# Patient Record
Sex: MALE | Race: WHITE
[De-identification: names, ages, dates, MRNs, and addresses within clinical notes are randomized per-mention and may not be internally consistent; named-entity substitution may affect disease eponyms.]

---

## 2019-01-21 ENCOUNTER — HOSPITAL ENCOUNTER (OUTPATIENT)
Dept: HOSPITAL 47 - RADUSWWP | Age: 59
Discharge: HOME | End: 2019-01-21
Attending: INTERNAL MEDICINE
Payer: MEDICARE

## 2019-01-21 DIAGNOSIS — K83.8: ICD-10-CM

## 2019-01-21 DIAGNOSIS — K76.0: Primary | ICD-10-CM

## 2019-01-21 PROCEDURE — 76700 US EXAM ABDOM COMPLETE: CPT

## 2019-01-21 NOTE — US
EXAMINATION TYPE: US abdomen complete

 

DATE OF EXAM: 1/21/2019

 

COMPARISON: NONE

 

CLINICAL HISTORY: 16-year-old male R10.11 RUQ abdominal pain. Intermittent abdomen pain x 1 year, get
s worse after eating

 

TECHNIQUE: Multiple sonographic images of the abdomen are obtained.

 

FINDINGS:

 

EXAM MEASUREMENTS:

 

Liver Length:  18.2 cm   

Gallbladder Wall:  0.2 cm   

CBD:  0.9 cm

Spleen:  10.4 cm   

Right Kidney:  11.2 x 4.6 x 5.7 cm 

Left Kidney:  10.7 x 4.9 x 4.8 cm   

 

 

 

Pancreas:  limited by overlying midline bowel gas

Liver:  enlarged, attenuating, mildly heterogeneous. The secondary limits assessment for focal lesion
. 

Gallbladder:  wnl

**Evidence for sonographic Orellana's sign:  no

CBD:  dilated 

Spleen:  wnl   

Right Kidney:  wnl   

Left Kidney:  wnl   

Upper IVC:  wnl  

Abd Aorta:  visualized portions wnl, limited by overlying midline bowel gas

 

 

 

 

 

IMPRESSION: 

1. Mild hepatomegaly (18.2 cm) with at least moderate hepatic steatosis. Correlate with LFTs, lipid p
rofile, and patient risk factors.

2. No cholelithiasis or evidence for acute cholecystitis.

3. However, the bile duct is dilated at 9 mm. Correlate with alkaline phosphatase and bilirubin level
s to exclude biliary obstruction. ERCP or MRCP if indicated.

## 2019-02-14 ENCOUNTER — HOSPITAL ENCOUNTER (OUTPATIENT)
Dept: HOSPITAL 47 - ORWHC2ENDO | Age: 59
Discharge: HOME | End: 2019-02-14
Payer: MEDICARE

## 2019-02-14 VITALS — HEART RATE: 47 BPM | RESPIRATION RATE: 16 BRPM

## 2019-02-14 VITALS — BODY MASS INDEX: 29.2 KG/M2

## 2019-02-14 VITALS — DIASTOLIC BLOOD PRESSURE: 73 MMHG | SYSTOLIC BLOOD PRESSURE: 122 MMHG

## 2019-02-14 VITALS — TEMPERATURE: 97.3 F

## 2019-02-14 DIAGNOSIS — Z79.899: ICD-10-CM

## 2019-02-14 DIAGNOSIS — F17.210: ICD-10-CM

## 2019-02-14 DIAGNOSIS — K63.5: ICD-10-CM

## 2019-02-14 DIAGNOSIS — D12.4: ICD-10-CM

## 2019-02-14 DIAGNOSIS — C18.0: ICD-10-CM

## 2019-02-14 DIAGNOSIS — D12.3: ICD-10-CM

## 2019-02-14 DIAGNOSIS — K29.50: Primary | ICD-10-CM

## 2019-02-14 DIAGNOSIS — Z88.6: ICD-10-CM

## 2019-02-14 DIAGNOSIS — D12.0: ICD-10-CM

## 2019-02-14 LAB
ALBUMIN SERPL-MCNC: 4.3 G/DL (ref 3.5–5)
ALP SERPL-CCNC: 76 U/L (ref 38–126)
ALT SERPL-CCNC: 60 U/L (ref 21–72)
AMYLASE SERPL-CCNC: 64 U/L (ref 30–110)
ANION GAP SERPL CALC-SCNC: 7 MMOL/L
AST SERPL-CCNC: 36 U/L (ref 17–59)
BASOPHILS # BLD AUTO: 0 K/UL (ref 0–0.2)
BASOPHILS NFR BLD AUTO: 0 %
BUN SERPL-SCNC: 14 MG/DL (ref 9–20)
CALCIUM SPEC-MCNC: 9.6 MG/DL (ref 8.4–10.2)
CHLORIDE SERPL-SCNC: 108 MMOL/L (ref 98–107)
CO2 SERPL-SCNC: 28 MMOL/L (ref 22–30)
EOSINOPHIL # BLD AUTO: 0.2 K/UL (ref 0–0.7)
EOSINOPHIL NFR BLD AUTO: 2 %
ERYTHROCYTE [DISTWIDTH] IN BLOOD BY AUTOMATED COUNT: 5.19 M/UL (ref 4.3–5.9)
ERYTHROCYTE [DISTWIDTH] IN BLOOD: 13.9 % (ref 11.5–15.5)
GLUCOSE SERPL-MCNC: 95 MG/DL (ref 74–99)
HCT VFR BLD AUTO: 48.9 % (ref 39–53)
HGB BLD-MCNC: 16.1 GM/DL (ref 13–17.5)
LIPASE SERPL-CCNC: 74 U/L (ref 23–300)
LYMPHOCYTES # SPEC AUTO: 2.6 K/UL (ref 1–4.8)
LYMPHOCYTES NFR SPEC AUTO: 27 %
MCH RBC QN AUTO: 31 PG (ref 25–35)
MCHC RBC AUTO-ENTMCNC: 32.9 G/DL (ref 31–37)
MCV RBC AUTO: 94.2 FL (ref 80–100)
MONOCYTES # BLD AUTO: 0.5 K/UL (ref 0–1)
MONOCYTES NFR BLD AUTO: 5 %
NEUTROPHILS # BLD AUTO: 6.3 K/UL (ref 1.3–7.7)
NEUTROPHILS NFR BLD AUTO: 65 %
PLATELET # BLD AUTO: 203 K/UL (ref 150–450)
POTASSIUM SERPL-SCNC: 4.4 MMOL/L (ref 3.5–5.1)
PROT SERPL-MCNC: 7.6 G/DL (ref 6.3–8.2)
SODIUM SERPL-SCNC: 143 MMOL/L (ref 137–145)
WBC # BLD AUTO: 9.7 K/UL (ref 3.8–10.6)

## 2019-02-14 PROCEDURE — 83690 ASSAY OF LIPASE: CPT

## 2019-02-14 PROCEDURE — 82103 ALPHA-1-ANTITRYPSIN TOTAL: CPT

## 2019-02-14 PROCEDURE — 86039 ANTINUCLEAR ANTIBODIES (ANA): CPT

## 2019-02-14 PROCEDURE — 82150 ASSAY OF AMYLASE: CPT

## 2019-02-14 PROCEDURE — 86376 MICROSOMAL ANTIBODY EACH: CPT

## 2019-02-14 PROCEDURE — 82390 ASSAY OF CERULOPLASMIN: CPT

## 2019-02-14 PROCEDURE — 85025 COMPLETE CBC W/AUTO DIFF WBC: CPT

## 2019-02-14 PROCEDURE — 83540 ASSAY OF IRON: CPT

## 2019-02-14 PROCEDURE — 43239 EGD BIOPSY SINGLE/MULTIPLE: CPT

## 2019-02-14 PROCEDURE — 45385 COLONOSCOPY W/LESION REMOVAL: CPT

## 2019-02-14 PROCEDURE — 82787 IGG 1 2 3 OR 4 EACH: CPT

## 2019-02-14 PROCEDURE — 86038 ANTINUCLEAR ANTIBODIES: CPT

## 2019-02-14 PROCEDURE — 45380 COLONOSCOPY AND BIOPSY: CPT

## 2019-02-14 PROCEDURE — 83550 IRON BINDING TEST: CPT

## 2019-02-14 PROCEDURE — 88305 TISSUE EXAM BY PATHOLOGIST: CPT

## 2019-02-14 PROCEDURE — 83516 IMMUNOASSAY NONANTIBODY: CPT

## 2019-02-14 PROCEDURE — 80074 ACUTE HEPATITIS PANEL: CPT

## 2019-02-14 PROCEDURE — 80053 COMPREHEN METABOLIC PANEL: CPT

## 2019-02-14 PROCEDURE — 82728 ASSAY OF FERRITIN: CPT

## 2019-02-14 NOTE — P.PCN
Date of Procedure: 02/14/19


Description of Procedure: 


Brief history:


The patient is a 51-year-old male with medical history significant for tobacco 

abuse who is been seen for EGD and colonoscopy for evaluation of abdominal 

pain.  He reports abdominal pain in the periumbilical region and right lower 

quadrant which has been occurring for approximately one year.  The patient had 

been seen in the office and started on Bentyl which she reports has improved 

this pain.  He also reported constipation and was started on MiraLAX which she 

has not needed.  The patient had previously undergone evaluation with a 

computed tomography scan of the abdomen which led to an appendectomy.  He also 

had ultrasound which was negative for cholecystitis or cholelithiasis with mild 

dilation of the CBD at 9 mm and mild hepatomegaly.





Procedure performed:


Esophagogastroduodenoscopy with cold biopsy


Colonoscopy with polypectomy and cold biopsy





Estimated blood loss:


Minimal.





Preoperative diagnosis:


Abdominal pain, patient reports last colonoscopy approximately 10 years ago





Anesthesia: 


MAC





Procedure:


After informed consent was obtained from the patient  was brought into the 

endoscopy unit and IV  sedation was administered by anesthesia under continuous 

monitoring.  Initially upper endoscopy was done.  The Olympus  video 

endoscope was inserted inserted into the mouth and esophagus intubated without 

any difficulty and was gradually advanced into the stomach and duodenum and 

carefully examined.  The bulb and second part of the duodenum appeared normal, 

except for a small area of erythema with superficial erosion in the duodenal 

bulb which was biopsied.  The scope was then withdrawn into the stomach 

adequately insufflated with air and upon careful examination  the antrum and 

body, cardia and fundus appeared normal, except for mild scattered erythema in 

the antrum and body suggestive of mild gastritis which was biopsied.  The scope 

was then withdrawn into the esophagus.  The GE junction was located at 41 cm to 

the incisors.  It appeared regular with no erythema erosions or ulcerations.  

Rest of the esophagus appeared normal.  Patient tolerated the procedure well.





At this time the patient continued to remain sedation.  Initial digital rectal 

examination was normal.  Olympus  video colonoscope was then inserted 

into the rectum and gradually advanced to the cecum without any difficulty.  

Careful examination was performed as the scope was gradually being withdrawn.  

The prep was good.  The IC valve appeared erythematous, edematous and friable 

with suspicion for inflammatory bowel disease, biopsies taken.  Random biopsies 

were taken in the right colon, transverse colon, left colon and rectum.  Cold 

snare polypectomy of a 6 mm cecal polyp was performed.  Diminutive polyps were 

found 1 polyp in the splenic flexure, 2 polyps in the descending colon, 2 polyp 

in the sigmoid colon and 2 polyps in the rectum ranging from 2 mm to 4 mm and 

retrieved with cold forcep polypectomy.  Patient tolerated the procedure well.





Impression:


1.  Gastritis, biopsied.  Duodenal bulb erythema and superficial erosion, 

biopsied.


2.  Erythematous, edematous and friable IC valve with biopsies taken to rule 

out IBD.


3.  Random biopsies of the right colon, transverse colon, left colon and rectum.


4.  Snare polypectomy of cecal polyp.


5.  Cold forcep polypectomy of polyps in the splenic flexure, descending colon, 

sigmoid colon





Recommendations:


Findings of this examination were discussed with the patient as well as his 

wife.  Await pathology from biopsies.  Patient will need follow-up in the 

gastroenterology clinic.  Patient will need repeat colonoscopy in 1-3 years 

pending findings from pathology.  Further recommendations pending findings from 

biopsies.

## 2019-02-15 LAB — LKM AB SER-ACNC: 1.3 UNITS (ref ?–20)

## 2019-04-20 ENCOUNTER — HOSPITAL ENCOUNTER (OUTPATIENT)
Dept: HOSPITAL 47 - RADPETMAIN | Age: 59
End: 2019-04-20
Attending: INTERNAL MEDICINE
Payer: MEDICARE

## 2019-04-20 DIAGNOSIS — Z98.890: ICD-10-CM

## 2019-04-20 DIAGNOSIS — C18.2: Primary | ICD-10-CM

## 2019-04-20 PROCEDURE — 78815 PET IMAGE W/CT SKULL-THIGH: CPT

## 2019-04-24 NOTE — PE
Nuclear medicine PET/CT

 

HISTORY: Colon carcinoma, subsequent

 

Patient received 12.1 mCi F-18 FDG intravenously in delayed scanning was performed from the skull bas
e to the mid thighs. Localization and attenuation correction CT scan was performed.

 

Patient's prior CT scans are available for comparison.

 

Neck and chest: There is no evident lung mass. No mediastinal, axillary, hilar, or cervical adenopath
y. No suspicious hypermetabolic uptake. Minimal inflammatory change noted in the right maxillary sinu
s.

 

Abdomen pelvis: Postop changes are present. There is no retroperitoneal mass or adenopathy. No eviden
t liver mass. Liver shows possible low-attenuation, consider hepatic steatosis. Atheromatous changes 
are present within the aorta. Postop changes are noted to the colon. Prostatic calcifications are pre
sent. No pelvic adenopathy. Urinary bladder is normal.

 

Osseous structures: Probable benign focus of calcification present anterior lateral to the left ilium
 anterior aspect, possible old avulsion injury. Degenerative disc changes, facet arthropathy noted in
 the lumbar spine.

 

IMPRESSION: Postop changes. No suspicious hypermetabolic uptake is evident.

## 2019-09-30 ENCOUNTER — HOSPITAL ENCOUNTER (INPATIENT)
Dept: HOSPITAL 47 - EC | Age: 59
LOS: 3 days | Discharge: HOME | DRG: 357 | End: 2019-10-03
Attending: HOSPITALIST | Admitting: HOSPITALIST
Payer: MEDICARE

## 2019-09-30 DIAGNOSIS — K59.00: ICD-10-CM

## 2019-09-30 DIAGNOSIS — Z83.3: ICD-10-CM

## 2019-09-30 DIAGNOSIS — E87.2: ICD-10-CM

## 2019-09-30 DIAGNOSIS — F17.200: ICD-10-CM

## 2019-09-30 DIAGNOSIS — Z90.49: ICD-10-CM

## 2019-09-30 DIAGNOSIS — D72.829: ICD-10-CM

## 2019-09-30 DIAGNOSIS — F41.9: ICD-10-CM

## 2019-09-30 DIAGNOSIS — Z85.820: ICD-10-CM

## 2019-09-30 DIAGNOSIS — K55.069: Primary | ICD-10-CM

## 2019-09-30 DIAGNOSIS — F31.9: ICD-10-CM

## 2019-09-30 DIAGNOSIS — Z80.9: ICD-10-CM

## 2019-09-30 DIAGNOSIS — Z88.6: ICD-10-CM

## 2019-09-30 DIAGNOSIS — R16.0: ICD-10-CM

## 2019-09-30 PROCEDURE — 85027 COMPLETE CBC AUTOMATED: CPT

## 2019-09-30 PROCEDURE — 83690 ASSAY OF LIPASE: CPT

## 2019-09-30 PROCEDURE — 85610 PROTHROMBIN TIME: CPT

## 2019-09-30 PROCEDURE — 85730 THROMBOPLASTIN TIME PARTIAL: CPT

## 2019-09-30 PROCEDURE — 85025 COMPLETE CBC W/AUTO DIFF WBC: CPT

## 2019-09-30 PROCEDURE — 76705 ECHO EXAM OF ABDOMEN: CPT

## 2019-09-30 PROCEDURE — 88305 TISSUE EXAM BY PATHOLOGIST: CPT

## 2019-09-30 PROCEDURE — 99285 EMERGENCY DEPT VISIT HI MDM: CPT

## 2019-09-30 PROCEDURE — 82150 ASSAY OF AMYLASE: CPT

## 2019-09-30 PROCEDURE — 80053 COMPREHEN METABOLIC PANEL: CPT

## 2019-09-30 PROCEDURE — 96375 TX/PRO/DX INJ NEW DRUG ADDON: CPT

## 2019-09-30 PROCEDURE — 96374 THER/PROPH/DIAG INJ IV PUSH: CPT

## 2019-09-30 PROCEDURE — 83605 ASSAY OF LACTIC ACID: CPT

## 2019-10-01 LAB
ALBUMIN SERPL-MCNC: 4.3 G/DL (ref 3.5–5)
ALP SERPL-CCNC: 74 U/L (ref 38–126)
ALT SERPL-CCNC: 29 U/L (ref 21–72)
AMYLASE SERPL-CCNC: 67 U/L (ref 30–110)
ANION GAP SERPL CALC-SCNC: 13 MMOL/L
APTT BLD: 28.9 SEC (ref 22–30)
AST SERPL-CCNC: 23 U/L (ref 17–59)
BASOPHILS # BLD AUTO: 0.1 K/UL (ref 0–0.2)
BASOPHILS NFR BLD AUTO: 1 %
BUN SERPL-SCNC: 13 MG/DL (ref 9–20)
CALCIUM SPEC-MCNC: 9.3 MG/DL (ref 8.4–10.2)
CHLORIDE SERPL-SCNC: 106 MMOL/L (ref 98–107)
CO2 SERPL-SCNC: 20 MMOL/L (ref 22–30)
EOSINOPHIL # BLD AUTO: 0.2 K/UL (ref 0–0.7)
EOSINOPHIL NFR BLD AUTO: 1 %
ERYTHROCYTE [DISTWIDTH] IN BLOOD BY AUTOMATED COUNT: 4.89 M/UL (ref 4.3–5.9)
ERYTHROCYTE [DISTWIDTH] IN BLOOD: 13.7 % (ref 11.5–15.5)
GLUCOSE SERPL-MCNC: 103 MG/DL (ref 74–99)
HCT VFR BLD AUTO: 44.9 % (ref 39–53)
HGB BLD-MCNC: 15.9 GM/DL (ref 13–17.5)
INR PPP: 0.9 (ref ?–1.2)
LYMPHOCYTES # SPEC AUTO: 2.5 K/UL (ref 1–4.8)
LYMPHOCYTES NFR SPEC AUTO: 17 %
MCH RBC QN AUTO: 32.6 PG (ref 25–35)
MCHC RBC AUTO-ENTMCNC: 35.4 G/DL (ref 31–37)
MCV RBC AUTO: 92 FL (ref 80–100)
MONOCYTES # BLD AUTO: 0.9 K/UL (ref 0–1)
MONOCYTES NFR BLD AUTO: 6 %
NEUTROPHILS # BLD AUTO: 11.1 K/UL (ref 1.3–7.7)
NEUTROPHILS NFR BLD AUTO: 75 %
PLATELET # BLD AUTO: 153 K/UL (ref 150–450)
POTASSIUM SERPL-SCNC: 4.3 MMOL/L (ref 3.5–5.1)
PROT SERPL-MCNC: 7.7 G/DL (ref 6.3–8.2)
PT BLD: 9.8 SEC (ref 9–12)
SODIUM SERPL-SCNC: 139 MMOL/L (ref 137–145)
WBC # BLD AUTO: 14.9 K/UL (ref 3.8–10.6)

## 2019-10-01 RX ADMIN — CEFAZOLIN SCH MLS/HR: 330 INJECTION, POWDER, FOR SOLUTION INTRAMUSCULAR; INTRAVENOUS at 23:52

## 2019-10-01 RX ADMIN — KETOROLAC TROMETHAMINE PRN MG: 30 INJECTION, SOLUTION INTRAMUSCULAR at 11:33

## 2019-10-01 RX ADMIN — ZOLPIDEM TARTRATE PRN MG: 5 TABLET ORAL at 21:33

## 2019-10-01 RX ADMIN — PANTOPRAZOLE SODIUM SCH MG: 40 INJECTION, POWDER, FOR SOLUTION INTRAVENOUS at 11:33

## 2019-10-01 RX ADMIN — LACTULOSE PRN GM: 20 SOLUTION ORAL at 11:35

## 2019-10-01 RX ADMIN — CEFAZOLIN SCH MLS/HR: 330 INJECTION, POWDER, FOR SOLUTION INTRAMUSCULAR; INTRAVENOUS at 11:31

## 2019-10-01 RX ADMIN — LACTULOSE PRN GM: 20 SOLUTION ORAL at 23:52

## 2019-10-01 NOTE — P.HPIM
History of Present Illness


patient is pleasant 51-year-old male came in with complains of the right lower 

quadrant abdominal pain has been going on for 4 days patient denied any diarrhea

nausea vomiting.  Patient pain is a sharp severe right lower quadrant patient 

had an appendectomy in the past and patient is a colon cancer with colon 

resection in the past.  Ultrasound of the abdomen was show done which showed 

gallbladder sludge there is a CAT scan from another facility unfortunately I am 

unable to open of the images will send him to the radiology department and see 

what it shows.  Patient is complaining of constipation for about the 5 days 

predominantly as patient is not eating the patient is not passing gas at 

although patient does have sluggish bowel sounds patient has abdominal pain even

before he is constipated as per the patient.  Patient denied any fever chills.  

General surgery is planning on cholecystectomy although patient doesn't have any

right upper quadrant tenderness and Orellana's sign is negative.








Review of Systems








REVIEW OF SYSTEMS: 


CONSTITUTIONAL: No fever, no malaise, no fatigue. 


HEENT: No recent visual problems or hearing problems. Denied any sore throat. 


CARDIOVASCULAR: No chest pain, orthopnea, PND, no palpitations, no syncope. 


PULMONARY: No shortness of breath, no cough, no hemoptysis. 


GASTROINTESTINAL: as mentioned in HPI


NEUROLOGICAL: No headaches, no weakness, no numbness. 


HEMATOLOGICAL: Denies any bleeding or petechiae. 


GENITOURINARY: Denies any burning micturition, frequency, or urgency. 


MUSCULOSKELETAL/RHEUMATOLOGICAL: Denies any joint pain, swelling, or any muscle 

pain. 


ENDOCRINE: Denies any polyuria or polydipsia. 





The rest of the 14-point review of systems is negative.








Past Medical History


Past Medical History: Cancer


Additional Past Medical History / Comment(s): melanoma on left side of face, 

enlarged liver


History of Any Multi-Drug Resistant Organisms: None Reported


Past Surgical History: Appendectomy, Orthopedic Surgery


Additional Past Surgical History / Comment(s): surgery to remove melanoma, rt 

shoulder surgery x2 rotator cuff, rt orbit "plastic piece put in ",  removed 18 

inches of the colon March 27,2019


Past Anesthesia/Blood Transfusion Reactions: No Reported Reaction


Past Psychological History: Anxiety, Bipolar


Smoking Status: Current every day smoker


Past Alcohol Use History: Rare


Additional Past Alcohol Use History / Comment(s): smoker since age 13 1ppd


Past Drug Use History: None Reported





- Past Family History


  ** Father


Family Medical History: Diabetes Mellitus


Additional Family Medical History / Comment(s): Hx MRSA





  ** Mother


Family Medical History: Cancer


Additional Family Medical History / Comment(s): brain





Medications and Allergies


                                Home Medications











 Medication  Instructions  Recorded  Confirmed  Type


 


No Known Home Medications  09/30/19 09/30/19 History








                                    Allergies











Allergy/AdvReac Type Severity Reaction Status Date / Time


 


ibuprofen Allergy  Rash/Hives Verified 09/30/19 23:21














Physical Exam


Vitals: 


                                   Vital Signs











  Temp Pulse Pulse Resp BP BP Pulse Ox


 


 10/01/19 07:45  98.0 F   65  16   119/70  92 L


 


 10/01/19 02:30  98.6 F   64    115/68  93 L


 


 10/01/19 01:25  98.3 F   63  18   133/70  93 L


 


 10/01/19 01:01     16   


 


 10/01/19 00:26   62   17  119/79   93 L


 


 09/30/19 23:18  99.1 F  59 L   16  126/92   95








                                Intake and Output











 09/30/19 10/01/19 10/01/19





 22:59 06:59 14:59


 


Other:   


 


  Voiding Method  Toilet Toilet


 


  # Voids  1 


 


  Weight  88.451 kg 

















PHYSICAL EXAMINATION: 





GENERAL: The patient is alert and oriented x3, not in any acute distress. Well 

developed, well nourished. 


HEENT: Pupils are round and equally reacting to light. EOMI. No scleral icterus.

No conjunctival pallor. Normocephalic, atraumatic. No pharyngeal erythema. No 

thyromegaly. 


CARDIOVASCULAR: S1 and S2 present. No murmurs, rubs, or gallops. 


PULMONARY: Chest is clear to auscultation, no wheezing or crackles. 


ABDOMEN: Soft, patient does have tenderness in the right lower quadrant and 

patient has a lot of pain when he tries to bend his legs


MUSCULOSKELETAL: No joint swelling or deformity.


EXTREMITIES: No cyanosis, clubbing, or pedal edema. 


NEUROLOGICAL: Gross neurological examination did not reveal any focal deficits. 


SKIN: No rashes. 

















Results


CBC & Chem 7: 


                                 10/01/19 00:10





                                 10/01/19 00:10


Labs: 


                  Abnormal Lab Results - Last 24 Hours (Table)











  10/01/19 10/01/19 Range/Units





  00:10 00:10 


 


WBC   14.9 H  (3.8-10.6)  k/uL


 


Neutrophils #   11.1 H  (1.3-7.7)  k/uL


 


Carbon Dioxide  20 L   (22-30)  mmol/L


 


Glucose  103 H   (74-99)  mg/dL


 


Total Bilirubin  1.8 H   (0.2-1.3)  mg/dL














Thrombosis Risk Factor Assmnt





- Choose All That Apply


Each Factor Represents 1 point: Age 41-60 years


Each Risk Factor Represents 2 Points: Malignancy


Thrombosis Risk Factor Assessment Total Risk Factor Score: 3


Thrombosis Risk Factor Assessment Level: Moderate Risk





Assessment and Plan


Plan: 


-right lower quadrant abdominal pain: patient had biliary sludge and it was 

believed patient may have cholecystitis general surgery evaluated the patient 

and planning on cholecystectomy.  Although his clinical signs and symptoms are 

not really consistent with cholecystitis will try and get the report on the CAT 

scan that was done at another facility.  Patient will continued on IV fluids 

pain medications will be switched to Toradol because of his constipation will 

use lactulose for his constipation


-Constipation lactulose for constipation Dilaudid will risk and your patient 

will be started on Toradol patient does have some mild ALLERGIC reaction to 

ibuprofen will monitor for any ALLERGIC reaction to Toradol.


-leukocytosis can be reactive or secondary to gallbladder disease as mentioned 

above


-Anion gap metabolic acidosis may have had lactic acidosis since patient is 

receiving IV fluids now I will not order any lactic acid level at this time.


-DVT prophylaxis ambulation, GI prophylaxis with Protonix

## 2019-10-01 NOTE — US
EXAMINATION TYPE: US gallbladder

 

DATE OF EXAM: 10/1/2019

 

COMPARISON: NONE

 

CLINICAL HISTORY: pain.

 

EXAM MEASUREMENTS:

 

Liver Length:  15.5 cm   

Gallbladder Wall:  0.2 cm   

CBD:  0.4 cm

Right Kidney:  11.7 x 4.9 x 5.3 cm

 

Extensive overlying bowel gas. 

 

Pancreas:  Obscured by bowel gas

Liver:  Increased attenuation, left lobe obscured by bowel gas, probable focal fatty sparing adjacent
 to gallbladder  

Gallbladder:  sludge

**Evidence for sonographic Orellana's sign:  no

CBD:  wnl 

Right Kidney:  wnl 

 

 

 

IMPRESSION: 

1. Gallbladder sludge with no evidence of wall thickening or cholelithiasis.

2. Correlate for hepatic steatosis or hepatitis. Area of increased echogenicity adjacent the gallblad
vijay most likely the basis of focal fatty sparing..

## 2019-10-01 NOTE — P.PN
Subjective


Progress Note Date: 10/01/19





CHIEF COMPLAINT: abdominal pain





HISTORY OF PRESENT ILLNESS: 59 year old male who was transferred from [facility] secondary to abdominal pain. Patient reports history of bowel resection

5 months ago at [facility]. He states oncology recommended chemotherapy but he 

refused. He is supposed to follow up in about a month with his surgeon. He 

reports pain to the right lower quadrant. Denies nausea or vomiting. 





PAST MEDICAL HISTORY: 


See list.





PAST SURGICAL HISTORY: 


See list.





SOCIAL HISTORY: No illicit drug use.  





REVIEW OF SYSTEMS: 


CONSTITUTIONAL: Denies fever or chills.


HEENT: Denies blurred vision, vision changes, or eye pain. Denies hemoptysis 


CARDIOVASCULAR: Denies chest pain or pressure.


RESPIRATORY: No shortness of breath. 


GASTROINTESTINAL: Refer to [facility] for pertinent findings


HEMATOLOGIC: Denies bleeding disorders.


GENITOURINARY:  Denies any blood in urine.


SKIN: Denies pruitis. Denies rash.





PHYSICAL EXAM: 


VITAL SIGNS: Reviewed.


GENERAL: Well-developed in no acute distress. 


HEENT:  No sclera icterus. Extraocular movements grossly intact.  Moist buccal 

mucosa. Head is atraumatic, normocephalic. 


ABDOMEN:  Soft.  Nondistended. Tenderness with palpation to right lower 

quadrant.


NEUROLOGIC: Alert and oriented. Cranial nerves II through XII grossly intact.





LABORATORY DATA:


WBC 14.9. Hemoglobin 15.9





IMAGIN. CT scan completed at outside facility reveals inflammatory changes of the 

omental fat in the right upper quadrant.  Increasing fluid in the right 

pericolic gutter compared to previous scan.  Increased inflammatory changes in 

the fat.


2. Gallbladder US: gallbladder sludge





ASSESSMENT: 


1.  Abdominal pain, US revealing gallbladder sludge, CT reveals inflammatory 

changes


2.  Recent history of colon resection secondary to colon cancer


3.  History of appendectomy





PLAN: 


Dr. Drummond evaluated patient at the bedside. Plan is for laparoscopic 

cholecystectomy tomorrow.  Clear liquid diet today.  Nothing by mouth at 

midnight.





Nurse practitioner note has been reviewed by physician. Signing provider agrees 

with the documented findings, assessment, and plan of care. 








Objective





- Vital Signs


Vital signs: 


                                   Vital Signs











Temp  98.0 F   10/01/19 07:45


 


Pulse  65   10/01/19 07:45


 


Resp  16   10/01/19 07:45


 


BP  119/70   10/01/19 07:45


 


Pulse Ox  92 L  10/01/19 07:45








                                 Intake & Output











 09/30/19 10/01/19 10/01/19





 18:59 06:59 18:59


 


Weight  88.451 kg 


 


Other:   


 


  Voiding Method  Toilet Toilet


 


  # Voids  1 














- Labs


CBC & Chem 7: 


                                 10/01/19 00:10





                                 10/01/19 00:10


Labs: 


                  Abnormal Lab Results - Last 24 Hours (Table)











  10/01/19 10/01/19 Range/Units





  00:10 00:10 


 


WBC   14.9 H  (3.8-10.6)  k/uL


 


Neutrophils #   11.1 H  (1.3-7.7)  k/uL


 


Carbon Dioxide  20 L   (22-30)  mmol/L


 


Glucose  103 H   (74-99)  mg/dL


 


Total Bilirubin  1.8 H   (0.2-1.3)  mg/dL

## 2019-10-01 NOTE — ED
Abdominal Pain HPI





- General


Chief Complaint: Abdominal Pain


Stated Complaint: abd pain


Time Seen by Provider: 09/30/19 23:20


Source: patient, EMS


Mode of arrival: EMS


Limitations: no limitations





- History of Present Illness


Initial Comments: 





This is a 51-year-old male the ER for evaluation patient resents today for 

evaluation regards to abdominal pain, except in transfer for evaluation 

regarding persistent right upper quadrant abdominal pain.,.  Medical history 

significant for recent colon resection secondary to colon cancer as well as 

appendectomy.  Patient still complaining of right-sided pain right upper 

quadrant pain nausea no vomiting.  Symptoms are progressively worsening for last

3 days.  Patient has no recent fevers cough or congestion otherwise no recent 

travel history no family members with similar complaints.  No prior history of 

gallbladder disease no history of pancreatic illness.


MD Complaint: abdominal pain


-: days(s) (3)


Location: RUQ, RLQ


Radiation: R flank


Migration to: no migration


Severity: moderate


Severity scale (1-10): 7


Quality: stabbing, aching


Consistency: constant


Improves With: nothing


Worsens With: nothing


Associated Symptoms: nausea





- Related Data


                                Home Medications











 Medication  Instructions  Recorded  Confirmed


 


No Known Home Medications  09/30/19 09/30/19











                                    Allergies











Allergy/AdvReac Type Severity Reaction Status Date / Time


 


ibuprofen Allergy  Rash/Hives Verified 09/30/19 23:21














Review of Systems


ROS Statement: 


Those systems with pertinent positive or pertinent negative responses have been 

documented in the HPI.





ROS Other: All systems not noted in ROS Statement are negative.





Past Medical History


Past Medical History: Cancer


Additional Past Medical History / Comment(s): melanoma on left side of face, 

enlarged liver


History of Any Multi-Drug Resistant Organisms: None Reported


Past Surgical History: Appendectomy, Orthopedic Surgery


Additional Past Surgical History / Comment(s): surgery to remove melanoma, rt 

shoulder surgery x2 rotator cuff, rt orbit "plastic piece put in ",


Past Anesthesia/Blood Transfusion Reactions: No Reported Reaction


Past Psychological History: Anxiety, Bipolar


Smoking Status: Current every day smoker





- Past Family History


  ** Father


Family Medical History: Diabetes Mellitus


Additional Family Medical History / Comment(s): Hx MRSA





  ** Mother


Family Medical History: Cancer


Additional Family Medical History / Comment(s): brain





General Exam


Limitations: no limitations


General appearance: alert, in no apparent distress


Head exam: Present: atraumatic, normocephalic, normal inspection


Eye exam: Present: normal appearance, EOMI.  Absent: scleral icterus, 

conjunctival injection, periorbital swelling


ENT exam: Present: normal exam, mucous membranes moist


Neck exam: Present: normal inspection.  Absent: tenderness, meningismus, 

lymphadenopathy


Respiratory exam: Present: normal lung sounds bilaterally.  Absent: respiratory 

distress, wheezes, rales, rhonchi, stridor


Cardiovascular Exam: Present: regular rate, normal rhythm, normal heart sounds. 

Absent: systolic murmur, diastolic murmur, rubs, gallop, clicks


GI/Abdominal exam: Present: soft, tenderness (Right upper quadrant), normal 

bowel sounds.  Absent: distended, guarding, rebound, rigid


Extremities exam: Present: normal inspection, full ROM, normal capillary refill.

 Absent: tenderness, pedal edema, joint swelling, calf tenderness


Back exam: Present: normal inspection


Neurological exam: Present: alert, oriented X3, CN II-XII intact


Psychiatric exam: Present: normal affect, normal mood


Skin exam: Present: warm, dry, intact, normal color.  Absent: rash





Course





                                   Vital Signs











  09/30/19





  23:18


 


Temperature 99.1 F


 


Pulse Rate 59 L


 


Respiratory 16





Rate 


 


Blood Pressure 126/92


 


O2 Sat by Pulse 95





Oximetry 














- Reevaluation(s)


Reevaluation #1: 





10/01/19 00:03


Medical records reviewed


10/01/19 00:03


Transfer paperwork is reviewed





Medical Decision Making





- Medical Decision Making





19 male except in transfer after workup at [facility] was concern for 

gallbladder illness.  Patient did not of ultrasound negative computed tomography

scan, patient will be admitted for ultrasound of gallbladder and further 

evaluation of abdominal pain





Disposition


Clinical Impression: 


 Abdominal pain





Narrative: 





RUQ abd pain


Disposition: ADMITTED AS IP TO THIS HOSP


Condition: Good


Is patient prescribed a controlled substance at d/c from ED?: No


Referrals: 


Laura Blanco MD [Primary Care Provider] - 1-2 days

## 2019-10-02 VITALS — RESPIRATION RATE: 16 BRPM

## 2019-10-02 LAB
ALBUMIN SERPL-MCNC: 3.5 G/DL (ref 3.5–5)
ALP SERPL-CCNC: 63 U/L (ref 38–126)
ALT SERPL-CCNC: 26 U/L (ref 21–72)
ANION GAP SERPL CALC-SCNC: 12 MMOL/L
AST SERPL-CCNC: 18 U/L (ref 17–59)
BUN SERPL-SCNC: 19 MG/DL (ref 9–20)
CALCIUM SPEC-MCNC: 8.5 MG/DL (ref 8.4–10.2)
CHLORIDE SERPL-SCNC: 105 MMOL/L (ref 98–107)
CO2 SERPL-SCNC: 23 MMOL/L (ref 22–30)
ERYTHROCYTE [DISTWIDTH] IN BLOOD BY AUTOMATED COUNT: 4.52 M/UL (ref 4.3–5.9)
ERYTHROCYTE [DISTWIDTH] IN BLOOD: 13.7 % (ref 11.5–15.5)
GLUCOSE SERPL-MCNC: 112 MG/DL (ref 74–99)
HCT VFR BLD AUTO: 42.7 % (ref 39–53)
HGB BLD-MCNC: 15.1 GM/DL (ref 13–17.5)
MCH RBC QN AUTO: 33.3 PG (ref 25–35)
MCHC RBC AUTO-ENTMCNC: 35.3 G/DL (ref 31–37)
MCV RBC AUTO: 94.4 FL (ref 80–100)
PLATELET # BLD AUTO: 170 K/UL (ref 150–450)
POTASSIUM SERPL-SCNC: 4.4 MMOL/L (ref 3.5–5.1)
PROT SERPL-MCNC: 6.4 G/DL (ref 6.3–8.2)
SODIUM SERPL-SCNC: 140 MMOL/L (ref 137–145)
WBC # BLD AUTO: 17.3 K/UL (ref 3.8–10.6)

## 2019-10-02 PROCEDURE — 0DBU0ZZ EXCISION OF OMENTUM, OPEN APPROACH: ICD-10-PCS

## 2019-10-02 RX ADMIN — PANTOPRAZOLE SODIUM SCH MG: 40 INJECTION, POWDER, FOR SOLUTION INTRAVENOUS at 09:16

## 2019-10-02 RX ADMIN — KETOROLAC TROMETHAMINE PRN MG: 30 INJECTION, SOLUTION INTRAMUSCULAR at 17:24

## 2019-10-02 RX ADMIN — CEFAZOLIN SCH: 330 INJECTION, POWDER, FOR SOLUTION INTRAMUSCULAR; INTRAVENOUS at 19:21

## 2019-10-02 NOTE — P.PN
Subjective


Progress Note Date: 10/02/19


Principal diagnosis: 


patient is pleasant 51-year-old male came in with complains of the right lower 

quadrant abdominal pain has been going on for 4 days patient denied any diarrhea

nausea vomiting.  Patient pain is a sharp severe right lower quadrant patient 

had an appendectomy in the past and patient is a colon cancer with colon 

resection in the past.  Ultrasound of the abdomen was show done which showed 

gallbladder sludge there is a CAT scan from another facility unfortunately I am 

unable to open of the images will send him to the radiology department and see 

what it shows.  Patient is complaining of constipation for about the 5 days 

predominantly as patient is not eating the patient is not passing gas at 

although patient does have sluggish bowel sounds patient has abdominal pain even

before he is constipated as per the patient.  Patient denied any fever chills.  

General surgery is planning on cholecystectomy although patient doesn't have any

right upper quadrant tenderness and Orellana's sign is negative.





10/02/2019


Patient is lying in bed in no acute distress talking on the phone with his 

family.  Patient was given a lactulose and states that it made his stomach pain 

worse and was requesting a Fleet enema.  Patient states that he feels much 

better after the enema and was passing gas and able to remove some of the 

bloating in his stomach that was causing discomfort.  She states that he did 

have a bowel movement this morning.  Patient denies any shortness of breath, 

chest pain, or palpitations at this time.  Patient is afebrile.  Patient is 

currently nothing by mouth and awaiting for surgery this afternoon to have a 

cholecystectomy.  Will continue to monitor closely.








Objective





- Vital Signs


Vital signs: 


                                   Vital Signs











Temp  98.7 F   10/02/19 07:00


 


Pulse  64   10/02/19 08:00


 


Resp  16   10/02/19 08:00


 


BP  108/66   10/02/19 07:00


 


Pulse Ox  93 L  10/02/19 07:00








                                 Intake & Output











 10/01/19 10/02/19 10/02/19





 18:59 06:59 18:59


 


Intake Total 1150  


 


Output Total  50 


 


Balance 1150 -50 


 


Intake:   


 


  Intake, IV Titration 700  





  Amount   


 


    Sodium Chloride 0.9% 1, 700  





    000 ml @ 100 mls/hr IV .   





    Q10H [facility] Rx#:[id number]   


 


  Oral 450  


 


Output:   


 


  Emesis  50 


 


Other:   


 


  Voiding Method Toilet Toilet Toilet


 


  # Voids  2 


 


  # Bowel Movements  1 














- Exam


GENERAL: The patient is alert and oriented x3, not in any acute distress. Well 

developed, well nourished.  Vital signs are stable.


HEENT: Pupils are round and equally reacting to light. EOMI. No scleral icterus.

No conjunctival pallor. Normocephalic, atraumatic. No pharyngeal erythema. No 

thyromegaly. 


CARDIOVASCULAR: S1 and S2 present. No murmurs, rubs, or gallops. 


PULMONARY: Chest is clear to auscultation, no wheezing or crackles. 


ABDOMEN: Soft, mild tenderness in the right lower quadrant 


MUSCULOSKELETAL: No joint swelling or deformity.


EXTREMITIES: No cyanosis, clubbing, or pedal edema. 


NEUROLOGICAL: Gross neurological examination did not reveal any focal deficits. 


SKIN: No rashes. 








- Labs


CBC & Chem 7: 


                                 10/02/19 07:00





                                 10/02/19 07:00


Labs: 


                  Abnormal Lab Results - Last 24 Hours (Table)











  10/02/19 10/02/19 Range/Units





  07:00 07:00 


 


WBC  17.3 H   (3.8-10.6)  k/uL


 


Glucose   112 H  (74-99)  mg/dL


 


Total Bilirubin   1.7 H  (0.2-1.3)  mg/dL














Assessment and Plan


Assessment: 





-right lower quadrant abdominal pain: patient had biliary sludge and it was 

believed patient may have cholecystitis general surgery evaluated the patient 

and planning on cholecystectomy.  Although his clinical signs and symptoms are 

not really consistent with cholecystitis will try and get the report on the CAT 

scan that was done at another facility.  Patient will continued on IV fluids 

pain medications will be switched to Toradol because of his constipation will 

use lactulose for his constipation


-Constipation lactulose for constipation, patient will be started on Toradol 

patient does have some mild ALLERGIC reaction to ibuprofen will monitor for any 

ALLERGIC reaction to Toradol.


-leukocytosis can be reactive or secondary to gallbladder disease as mentioned 

above


-Anion gap metabolic acidosis may have had lactic acidosis since patient is 

receiving IV fluids now I will not order any lactic acid level at this time.


-DVT prophylaxis ambulation


GI prophylaxis with Protonix





Recommendations and discussion:


Recommend continue current medications, management, and symptomatic treatment.  

Patient is scheduled to undergo a cholecystectomy today and is currently nothing

by mouth.  Patient was given lactulose yesterday one time and refuses any 

further stating that it was irritating his stomach and was requesting a fleets 

enema.  Patient states that his stomach feels much better and is able to pass 

gas and had a bowel movement this morning.  Will continue to monitor.  Guarded 

prognosis.  Further recommendations to follow.  Possible discharge in 24-48 

hours.

## 2019-10-02 NOTE — P.OP
Date of Procedure: 10/02/19


Preoperative Diagnosis: 


Cholelithiasis


Postoperative Diagnosis: 


Infarcted omentum


Procedure(s) Performed: 


Partial omentectomy


Anesthesia: KAELA


Surgeon: Ian Drummond


Estimated Blood Loss (ml): 10


Pathology: other (omentum)


Condition: stable


Disposition: PACU


Description of Procedure: 


The patient's placed on the operating table in the supine position.  He received

general anesthesia.  His abdomen was prepped and draped usual sterile fashion.  

The skin incision sites were anesthetized 1% local Xylocaine.  Using a 15 blade 

the skin was incised in the infra umbilical position and then using a Alpaugh 

clamp the umbilicus is grasped and then a Veress needles placed into the kathryn

david cavity.  Position of the Veress needle was confirmed with positive drop 

test.  After adequate insufflation the laparoscope placed.  Cavity.  Upon 

inserting the laparoscope there was evidence of serosanguineous ascites there 

was a large portion of the omentum which was infarcted.  At this point a another

10 mm trocar was placed into the pelvic cavity the epigastric position.  

Approximate 400 mL of cherry colored ascites was aspirated.  At this point the 

gallbladder was visualized and the did not appear to be significantly inflamed.





Due to the large portion of omentum that was infarcted it was decided to convert

to open procedure.  The trochars withdrawn.  A midline skin incision was made in

the left cautery the abdominal wall was divided between





Cavity another 200 mL of cherry colored fluid was aspirated.  The omentum had 

some small bowel stuck to it this was dissected free with blunt dissection the 

infarcted omentum measured prostate 15 x 15 cm.  It was ischemic with necrosis. 

Using the Harmonic scissors the omentectomy was performed.  The specimens of 

pathology.  The abdomen was irrigated still bleeding seen.  The fascia was 

closed with looped #1 PDS suture.  Skin was closed staples.  Patient top she 

will was sent to recovery room and still position.

## 2019-10-03 VITALS — SYSTOLIC BLOOD PRESSURE: 116 MMHG | TEMPERATURE: 98.3 F | DIASTOLIC BLOOD PRESSURE: 73 MMHG

## 2019-10-03 VITALS — HEART RATE: 63 BPM

## 2019-10-03 RX ADMIN — PANTOPRAZOLE SODIUM SCH MG: 40 INJECTION, POWDER, FOR SOLUTION INTRAVENOUS at 08:10

## 2019-10-03 RX ADMIN — KETOROLAC TROMETHAMINE PRN MG: 30 INJECTION, SOLUTION INTRAMUSCULAR at 02:00

## 2019-10-03 RX ADMIN — CEFAZOLIN SCH MLS/HR: 330 INJECTION, POWDER, FOR SOLUTION INTRAMUSCULAR; INTRAVENOUS at 02:41

## 2019-10-03 RX ADMIN — ZOLPIDEM TARTRATE PRN MG: 5 TABLET ORAL at 02:01

## 2019-10-03 RX ADMIN — KETOROLAC TROMETHAMINE PRN MG: 30 INJECTION, SOLUTION INTRAMUSCULAR at 08:17

## 2019-10-03 NOTE — P.PN
Progress Note - Text


Progress Note Date: 10/03/19





The patient is postoperative day 1 from partial omentectomy.  He is doing quite 

well.  He is tolerating a diet.





On exam his vital signs are stable.  His abdomen soft.  His incision site is 

clean dry and intact.





Patiently discharged home today he'll follow-up with myself next week.

## 2019-10-03 NOTE — P.PN
Subjective


Progress Note Date: 10/03/19


Principal diagnosis: 


patient is pleasant 51-year-old male came in with complains of the right lower 

quadrant abdominal pain has been going on for 4 days patient denied any diarrhea

nausea vomiting.  Patient pain is a sharp severe right lower quadrant patient 

had an appendectomy in the past and patient is a colon cancer with colon 

resection in the past.  Ultrasound of the abdomen was show done which showed 

gallbladder sludge there is a CAT scan from another facility unfortunately I am 

unable to open of the images will send him to the radiology department and see 

what it shows.  Patient is complaining of constipation for about the 5 days 

predominantly as patient is not eating the patient is not passing gas at 

although patient does have sluggish bowel sounds patient has abdominal pain even

before he is constipated as per the patient.  Patient denied any fever chills.  

General surgery is planning on cholecystectomy although patient doesn't have any

right upper quadrant tenderness and Orellana's sign is negative.





10/02/2019


Patient is lying in bed in no acute distress talking on the phone with his 

family.  Patient was given a lactulose and states that it made his stomach pain 

worse and was requesting a Fleet enema.  Patient states that he feels much 

better after the enema and was passing gas and able to remove some of the 

bloating in his stomach that was causing discomfort.  She states that he did 

have a bowel movement this morning.  Patient denies any shortness of breath, 

chest pain, or palpitations at this time.  Patient is afebrile.  Patient is 

currently nothing by mouth and awaiting for surgery this afternoon to have a 

cholecystectomy.  Will continue to monitor closely.





10/03/2019


Patient is dressed and sitting at the side of the bed in no acute distress 

awaiting to go home today.  Patient underwent partial omentectomy yesterday with

Dr. Drummond at the visualized the gallbladder did not appear significantly 

inflamed but there was a large area of infarction of the omentum with ischemia 

and necrosis.  Patient would like to go home today.  Patient denies any chest 

pain, shortness of breath, or palpitations at this time.  Patient is afebrile.  

Patient states that he is passing gas and still having bowel movements.  Patient

was on ice chips and popsicles and tolerating.  Guarded prognosis.








Objective





- Vital Signs


Vital signs: 


                                   Vital Signs











Temp  98.3 F   10/03/19 07:00


 


Pulse  63   10/03/19 07:00


 


Resp  16   10/03/19 07:16


 


BP  116/73   10/03/19 07:00


 


Pulse Ox  92 L  10/03/19 07:00








                                 Intake & Output











 10/02/19 10/03/19 10/03/19





 18:59 06:59 18:59


 


Intake Total 2050  


 


Output Total 30  


 


Balance 2020  


 


Intake:   


 


  IV 1350  


 


  Intake, IV Titration 700  





  Amount   


 


    Sodium Chloride 0.9% 1, 700  





    000 ml @ 100 mls/hr IV .   





    Q10H [facility] Rx#:[id number]   


 


Output:   


 


  Estimated Blood Loss 30  


 


Other:   


 


  Voiding Method Toilet Toilet Toilet


 


  # Voids  1 














- Exam


GENERAL: The patient is alert and oriented x3, not in any acute distress. Well 

developed, well nourished.  Vital signs are stable.


HEENT: Pupils are round and equally reacting to light. EOMI. No scleral icterus.

No conjunctival pallor. Normocephalic, atraumatic. No pharyngeal erythema. No 

thyromegaly. 


CARDIOVASCULAR: S1 and S2 present. No murmurs, rubs, or gallops. 


PULMONARY: Chest is clear to auscultation, no wheezing or crackles. 


ABDOMEN: Soft, nontender.


MUSCULOSKELETAL: No joint swelling or deformity.


EXTREMITIES: No cyanosis, clubbing, or pedal edema. 


NEUROLOGICAL: Gross neurological examination did not reveal any focal deficits. 


SKIN: No rashes. 








- Labs


CBC & Chem 7: 


                                 10/02/19 07:00





                                 10/02/19 07:00





Assessment and Plan


Assessment: 





-right lower quadrant abdominal pain: patient had biliary sludge and it was 

believed patient may have cholecystitis general surgery evaluated the patient 

and planning on cholecystectomy.  Although his clinical signs and symptoms are 

not really consistent with cholecystitis will try and get the report on the CAT 

scan that was done at another facility.  Patient will continued on IV fluids 

pain medications will be switched to Toradol because of his constipation will 

use lactulose for his constipation


-Constipation lactulose for constipation, patient will be started on Toradol 

patient does have some mild ALLERGIC reaction to ibuprofen will monitor for any 

ALLERGIC reaction to Toradol.


-leukocytosis can be reactive or secondary to gallbladder disease as mentioned 

above


-Anion gap metabolic acidosis may have had lactic acidosis since patient is 

receiving IV fluids now I will not order any lactic acid level at this time.


-DVT prophylaxis ambulation


GI prophylaxis with Protonix





Recommendations and discussion:


Recommend continue current medications, management, and symptomatic treatment.  

Patient underwent partial laminectomy surgery yesterday.  Patient is tolerating 

ice chips and popsicles.  Patient states he is passing gas and having bowel 

movements.  Patient states he will be going home today.  Will continue to 

monitor.  Guarded prognosis.  Further recommendations to follow.  Patient is 

being discharged today.

## 2021-05-26 ENCOUNTER — HOSPITAL ENCOUNTER (OUTPATIENT)
Dept: HOSPITAL 47 - RADXRYALE | Age: 61
Discharge: HOME | End: 2021-05-26
Attending: INTERNAL MEDICINE
Payer: MEDICARE

## 2021-05-26 DIAGNOSIS — M19.011: Primary | ICD-10-CM

## 2021-05-26 NOTE — XR
Right shoulder

 

HISTORY: Pain

 

3 views of the right shoulder

 

There is remodeling of the humeral head and bony glenoid, subchondral sclerosis, marginal spurring is
 present. There is joint space loss. Alignment and bone mineralization maintained. No fracture or dis
location. There is remodeling at the acromioclavicular joint, correlate for history of trauma, surgic
al change, distal clavicle appears tapered and there is an ossific density possibly related to osteoa
rthritis.

 

IMPRESSION: Osteoarthritis glenohumeral joint on the right, findings in the acromial clavicular joint
 is described.

## 2021-06-16 ENCOUNTER — HOSPITAL ENCOUNTER (OUTPATIENT)
Dept: HOSPITAL 47 - RADXRYALE | Age: 61
Discharge: HOME | End: 2021-06-16
Attending: INTERNAL MEDICINE
Payer: MEDICARE

## 2021-06-16 DIAGNOSIS — R31.9: Primary | ICD-10-CM

## 2021-06-16 DIAGNOSIS — R10.11: ICD-10-CM

## 2021-06-16 PROCEDURE — 74018 RADEX ABDOMEN 1 VIEW: CPT

## 2021-06-16 NOTE — XR
KUB

 

HISTORY: Hematuria, right upper quadrant pain

 

Frontal KUB and 2 images, comparison to prior exam, CT 9/30/2019

 

Question a calcification superimposed over the transverse process of the L2 vertebral body on the rig
ht measuring approximately 15 mm. Probable vascular calcifications within the pelvis. Lung bases are 
clear. Right hemidiaphragm mildly elevated. There is a slight spinal curvature, degenerative disc kina
nge present in the lumbar spine. No evident bowel obstruction or pneumoperitoneum.

 

IMPRESSION: Question calcification as described, may represent renal pelvis calcification, CT scan co
uld be performed for better evaluation

## 2021-08-20 ENCOUNTER — HOSPITAL ENCOUNTER (OUTPATIENT)
Dept: HOSPITAL 47 - RADPETMAIN | Age: 61
Discharge: HOME | End: 2021-08-20
Attending: INTERNAL MEDICINE
Payer: MEDICARE

## 2021-08-20 DIAGNOSIS — Z85.038: ICD-10-CM

## 2021-08-20 DIAGNOSIS — R59.0: ICD-10-CM

## 2021-08-20 DIAGNOSIS — C78.6: Primary | ICD-10-CM

## 2021-08-20 PROCEDURE — 78815 PET IMAGE W/CT SKULL-THIGH: CPT

## 2024-01-31 NOTE — PE
Nuclear medicine PET/CT

 

HISTORY: C 18.2, subsequent

 

Patient received 10.7 mCi F-18 FDG intravenously in delayed scanning was performed from the skull bas
e to the mid thighs. Localization and attenuation correction CT scan was performed.

 

Correlation to prior nuclear medicine PET/CT dated 4/20/2019

 

Chest and neck: There are multiple areas of abnormal suspicious uptake. No cervical or supraclavicula
r, no axillary adenopathy. Retrocaval pretracheal node shows a short axis measurement of 18 mm and pr
eviously was not enlarged measuring only 6 to 7 mm in short axis with SUV now 8.8, there is a small p
revascular node as on prior exam. Within the superior mediastinum and nonenlarged node shows an SUV o
f 3, axial image 93, additional superior mediastinal node which is not enlarged on axial image 84 mena
ws an SUV 3.3. At the same level just deep to the clavicle on the left there is a focus of uptake, OSULLIVAN
V 3.1 which may correspond to a node which is not enlarged. Right hilar node shows an SUV 3.6. Subple
ural nodular density in the right upper lobe measures only 2 mm, there is no pleural or pericardial e
ffusion

 

ABDOMEN: Abnormal uptake is present at the central portion of liver adjacent to the inferior vena cav
a towards the dome of the liver, SUV 9.6, there abnormal portal nodes, SUV 11.1, retroperitoneal dale
opathy also present, abnormal soft tissue into the inferior vena cava shows SUV 11.2. Along the retro
peritoneum in the infrarenal location there is abnormal soft tissue, SUV 8.3. Smaller nodes towards t
he aortic bifurcation, SUV 4.2. Right iliac node shows an SUV 3.4 but is not enlarged.

 

Osseous structures: L2 vertebral body shows lucency which is developed in the interval, SUV 6.8. Low-
attenuation within the liver may be due to hepatic steatosis. Postop changes are noted to the bowel.

 

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
(2) completely pink